# Patient Record
Sex: FEMALE | Race: WHITE
[De-identification: names, ages, dates, MRNs, and addresses within clinical notes are randomized per-mention and may not be internally consistent; named-entity substitution may affect disease eponyms.]

---

## 2019-10-11 ENCOUNTER — HOSPITAL ENCOUNTER (EMERGENCY)
Dept: HOSPITAL 95 - ER | Age: 59
Discharge: HOME | End: 2019-10-11
Payer: MEDICARE

## 2019-10-11 VITALS — BODY MASS INDEX: 47.09 KG/M2 | WEIGHT: 293 LBS | HEIGHT: 66 IN

## 2019-10-11 DIAGNOSIS — Z79.82: ICD-10-CM

## 2019-10-11 DIAGNOSIS — Z88.0: ICD-10-CM

## 2019-10-11 DIAGNOSIS — L03.115: Primary | ICD-10-CM

## 2019-10-11 DIAGNOSIS — Z87.891: ICD-10-CM

## 2019-10-11 DIAGNOSIS — Z79.899: ICD-10-CM

## 2019-10-11 DIAGNOSIS — J44.9: ICD-10-CM

## 2019-10-11 DIAGNOSIS — E78.00: ICD-10-CM

## 2019-10-11 DIAGNOSIS — Z88.8: ICD-10-CM

## 2019-10-11 DIAGNOSIS — F32.9: ICD-10-CM

## 2019-10-11 DIAGNOSIS — K21.9: ICD-10-CM

## 2019-10-11 DIAGNOSIS — I10: ICD-10-CM

## 2019-10-13 ENCOUNTER — HOSPITAL ENCOUNTER (EMERGENCY)
Dept: HOSPITAL 95 - ER | Age: 59
Discharge: HOME | End: 2019-10-13
Payer: MEDICARE

## 2019-10-13 VITALS — WEIGHT: 293 LBS | HEIGHT: 66 IN | BODY MASS INDEX: 47.09 KG/M2

## 2019-10-13 DIAGNOSIS — E11.9: ICD-10-CM

## 2019-10-13 DIAGNOSIS — I10: ICD-10-CM

## 2019-10-13 DIAGNOSIS — J44.9: ICD-10-CM

## 2019-10-13 DIAGNOSIS — E78.5: ICD-10-CM

## 2019-10-13 DIAGNOSIS — Z88.0: ICD-10-CM

## 2019-10-13 DIAGNOSIS — Z88.8: ICD-10-CM

## 2019-10-13 DIAGNOSIS — F32.9: ICD-10-CM

## 2019-10-13 DIAGNOSIS — L02.415: Primary | ICD-10-CM

## 2019-10-13 DIAGNOSIS — Z79.899: ICD-10-CM

## 2019-10-13 DIAGNOSIS — L03.115: ICD-10-CM

## 2019-10-13 DIAGNOSIS — K21.9: ICD-10-CM

## 2019-10-13 DIAGNOSIS — Z87.891: ICD-10-CM

## 2019-10-13 DIAGNOSIS — Z79.82: ICD-10-CM

## 2019-10-13 LAB
ALBUMIN SERPL BCP-MCNC: 2.6 G/DL (ref 3.4–5)
ALBUMIN/GLOB SERPL: 0.6 {RATIO} (ref 0.8–1.8)
ALT SERPL W P-5'-P-CCNC: 31 U/L (ref 12–78)
ANION GAP SERPL CALCULATED.4IONS-SCNC: 5 MMOL/L (ref 6–16)
AST SERPL W P-5'-P-CCNC: 24 U/L (ref 12–37)
BASOPHILS # BLD AUTO: 0.03 K/MM3 (ref 0–0.23)
BASOPHILS NFR BLD AUTO: 0 % (ref 0–2)
BILIRUB SERPL-MCNC: 0.4 MG/DL (ref 0.1–1)
BUN SERPL-MCNC: 17 MG/DL (ref 8–24)
CALCIUM SERPL-MCNC: 8.9 MG/DL (ref 8.5–10.1)
CHLORIDE SERPL-SCNC: 105 MMOL/L (ref 98–108)
CO2 SERPL-SCNC: 26 MMOL/L (ref 21–32)
CREAT SERPL-MCNC: 0.86 MG/DL (ref 0.4–1)
DEPRECATED RDW RBC AUTO: 54.7 FL (ref 35.1–46.3)
EOSINOPHIL # BLD AUTO: 0.43 K/MM3 (ref 0–0.68)
EOSINOPHIL NFR BLD AUTO: 4 % (ref 0–6)
ERYTHROCYTE [DISTWIDTH] IN BLOOD BY AUTOMATED COUNT: 16.6 % (ref 11.7–14.2)
GLOBULIN SER CALC-MCNC: 4.3 G/DL (ref 2.2–4)
GLUCOSE SERPL-MCNC: 100 MG/DL (ref 70–99)
HCT VFR BLD AUTO: 40.6 % (ref 33–51)
HGB BLD-MCNC: 12.6 G/DL (ref 11.5–16)
IMM GRANULOCYTES # BLD AUTO: 0.03 K/MM3 (ref 0–0.1)
IMM GRANULOCYTES NFR BLD AUTO: 0 % (ref 0–1)
LYMPHOCYTES # BLD AUTO: 2.07 K/MM3 (ref 0.84–5.2)
LYMPHOCYTES NFR BLD AUTO: 21 % (ref 21–46)
MCHC RBC AUTO-ENTMCNC: 31 G/DL (ref 31.5–36.5)
MCV RBC AUTO: 89 FL (ref 80–100)
MONOCYTES # BLD AUTO: 0.69 K/MM3 (ref 0.16–1.47)
MONOCYTES NFR BLD AUTO: 7 % (ref 4–13)
NEUTROPHILS # BLD AUTO: 6.59 K/MM3 (ref 1.96–9.15)
NEUTROPHILS NFR BLD AUTO: 67 % (ref 41–73)
NRBC # BLD AUTO: 0 K/MM3 (ref 0–0.02)
NRBC BLD AUTO-RTO: 0 /100 WBC (ref 0–0.2)
PLATELET # BLD AUTO: 283 K/MM3 (ref 150–400)
POTASSIUM SERPL-SCNC: 4.4 MMOL/L (ref 3.5–5.5)
PROT SERPL-MCNC: 6.9 G/DL (ref 6.4–8.2)
SODIUM SERPL-SCNC: 136 MMOL/L (ref 136–145)

## 2020-03-06 ENCOUNTER — HOSPITAL ENCOUNTER (OUTPATIENT)
Dept: HOSPITAL 95 - LAB SHORT | Age: 60
Discharge: HOME | End: 2020-03-06
Attending: NURSE PRACTITIONER
Payer: MEDICARE

## 2020-03-06 DIAGNOSIS — Z12.4: Primary | ICD-10-CM

## 2020-03-06 PROCEDURE — G0123 SCREEN CERV/VAG THIN LAYER: HCPCS

## 2020-03-10 LAB — OTHER STN SPEC: (no result)

## 2020-11-20 ENCOUNTER — HOSPITAL ENCOUNTER (OUTPATIENT)
Dept: HOSPITAL 95 - LAB SHORT | Age: 60
End: 2020-11-20
Attending: NURSE PRACTITIONER
Payer: MEDICARE

## 2020-11-20 DIAGNOSIS — Z00.00: Primary | ICD-10-CM

## 2020-11-20 PROCEDURE — G0123 SCREEN CERV/VAG THIN LAYER: HCPCS

## 2020-11-24 LAB — OTHER STN SPEC: (no result)

## 2021-07-06 ENCOUNTER — HOSPITAL ENCOUNTER (OUTPATIENT)
Dept: HOSPITAL 95 - LAB SHORT | Age: 61
Discharge: HOME | End: 2021-07-06
Attending: DERMATOLOGY
Payer: MEDICARE

## 2021-07-06 DIAGNOSIS — L72.0: Primary | ICD-10-CM

## 2022-06-07 ENCOUNTER — HOSPITAL ENCOUNTER (OUTPATIENT)
Dept: HOSPITAL 95 - LAB SHORT | Age: 62
Discharge: HOME | End: 2022-06-07
Attending: NURSE PRACTITIONER
Payer: MEDICARE

## 2022-06-07 DIAGNOSIS — R30.0: Primary | ICD-10-CM

## 2022-07-19 ENCOUNTER — APPOINTMENT (RX ONLY)
Dept: URBAN - NONMETROPOLITAN AREA CLINIC 8 | Facility: CLINIC | Age: 62
Setting detail: DERMATOLOGY
End: 2022-07-19

## 2022-07-19 ENCOUNTER — HOSPITAL ENCOUNTER (OUTPATIENT)
Dept: HOSPITAL 95 - LAB SHORT | Age: 62
End: 2022-07-19
Attending: DERMATOLOGY
Payer: MEDICARE

## 2022-07-19 DIAGNOSIS — R58: ICD-10-CM

## 2022-07-19 DIAGNOSIS — R20.8: ICD-10-CM

## 2022-07-19 DIAGNOSIS — D22 MELANOCYTIC NEVI: ICD-10-CM

## 2022-07-19 DIAGNOSIS — L91.8: Primary | ICD-10-CM

## 2022-07-19 PROBLEM — D48.5 NEOPLASM OF UNCERTAIN BEHAVIOR OF SKIN: Status: ACTIVE | Noted: 2022-07-19

## 2022-07-19 PROCEDURE — ? SHAVE REMOVAL

## 2022-07-19 PROCEDURE — 11300 SHAVE SKIN LESION 0.5 CM/<: CPT

## 2022-07-19 ASSESSMENT — LOCATION ZONE DERM: LOCATION ZONE: LEG

## 2022-07-19 ASSESSMENT — LOCATION SIMPLE DESCRIPTION DERM: LOCATION SIMPLE: LEFT THIGH

## 2022-07-19 ASSESSMENT — LOCATION DETAILED DESCRIPTION DERM: LOCATION DETAILED: LEFT ANTERIOR MEDIAL PROXIMAL THIGH

## 2022-07-19 NOTE — HPI: EVALUATION OF SKIN LESION(S)
Hpi Title: Evaluation of a Skin Lesion
Have Your Spot(S) Been Treated In The Past?: has not been treated
What Type Of Note Output Would You Prefer (Optional)?: Bullet Format
Family Member: Mother

## 2022-07-19 NOTE — PROCEDURE: SHAVE REMOVAL
Medical Necessity Information: It is in your best interest to select a reason for this procedure from the list below. All of these items fulfill various CMS LCD requirements except the new and changing color options.
Medical Necessity Clause: This procedure was medically necessary because the lesion that was treated was:
Detail Level: Detailed
Was A Bandage Applied: Yes
Size Of Lesion In Cm (Required): 0.5
X Size Of Lesion In Cm (Optional): 0
Biopsy Method: Personna blade
Anesthesia Type: 1% lidocaine with epinephrine and a 1:10 solution of 8.4% sodium bicarbonate
Hemostasis: Drysol
Wound Care: Petrolatum
Render Path Notes In Note?: No
Consent: Written consent was obtained from the patient and risks were reviewed including but not limited to infection, scarring, bleeding, scabbing, incomplete removal, allergy to anesthesia, and recurrence.  Prior to the procedure, the treatment site was clearly identified and confirmed by the patient.
Post-Care Instructions: I reviewed with the patient in detail post-care instructions. Patient is to keep the biopsy site dry overnight, and then wash, gently blot, and apply petrolatum or mupirocin once daily until healed. Wound care handout given.
Notification Instructions: Patient will be notified of pathology results. However, patient instructed to call the office if not contacted within 2 weeks.
Billing Type: Third-Party Bill

## 2023-08-16 ENCOUNTER — APPOINTMENT (RX ONLY)
Dept: URBAN - NONMETROPOLITAN AREA CLINIC 8 | Facility: CLINIC | Age: 63
Setting detail: DERMATOLOGY
End: 2023-08-16

## 2023-08-16 DIAGNOSIS — L82.0 INFLAMED SEBORRHEIC KERATOSIS: ICD-10-CM

## 2023-08-16 PROCEDURE — 17110 DESTRUCTION B9 LES UP TO 14: CPT

## 2023-08-16 PROCEDURE — ? LIQUID NITROGEN

## 2023-08-16 ASSESSMENT — LOCATION DETAILED DESCRIPTION DERM
LOCATION DETAILED: LEFT PROXIMAL PRETIBIAL REGION
LOCATION DETAILED: LEFT PROXIMAL POSTERIOR UPPER ARM

## 2023-08-16 ASSESSMENT — LOCATION SIMPLE DESCRIPTION DERM
LOCATION SIMPLE: LEFT POSTERIOR UPPER ARM
LOCATION SIMPLE: LEFT PRETIBIAL REGION

## 2023-08-16 ASSESSMENT — LOCATION ZONE DERM
LOCATION ZONE: ARM
LOCATION ZONE: LEG

## 2023-08-16 NOTE — PROCEDURE: LIQUID NITROGEN
Number Of Freeze-Thaw Cycles: 1 freeze-thaw cycle
Medical Necessity Clause: This procedure was medically necessary because the lesions that were treated were:
Spray Paint Technique: No
Duration Of Freeze Thaw-Cycle (Seconds): 6
Detail Level: Simple
Show Applicator Variable?: Yes
Post-Care Instructions: I reviewed with the patient in detail post-care instructions. Patient is to wear sunprotection, and avoid picking at any of the treated lesions. Pt may apply Vaseline to crusted or scabbing areas.
Spray Paint Text: The liquid nitrogen was applied to the skin utilizing a spray paint frosting technique.
Medical Necessity Information: It is in your best interest to select a reason for this procedure from the list below. All of these items fulfill various CMS LCD requirements except the new and changing color options.
Application Tool (Optional): Liquid Nitrogen Sprayer
Consent: The patient's verbal consent was obtained including but not limited to risks of crusting, scabbing, blistering, scarring, darker or lighter pigmentary change, recurrence, incomplete removal and infection.